# Patient Record
Sex: FEMALE | Race: WHITE | NOT HISPANIC OR LATINO | Employment: STUDENT | URBAN - METROPOLITAN AREA
[De-identification: names, ages, dates, MRNs, and addresses within clinical notes are randomized per-mention and may not be internally consistent; named-entity substitution may affect disease eponyms.]

---
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
## 2019-03-05 ENCOUNTER — OFFICE VISIT (OUTPATIENT)
Dept: FAMILY MEDICINE CLINIC | Facility: CLINIC | Age: 19
End: 2019-03-05
Payer: COMMERCIAL

## 2019-03-05 VITALS
BODY MASS INDEX: 35.23 KG/M2 | HEART RATE: 80 BPM | HEIGHT: 66 IN | TEMPERATURE: 98.7 F | WEIGHT: 219.2 LBS | SYSTOLIC BLOOD PRESSURE: 118 MMHG | RESPIRATION RATE: 16 BRPM | DIASTOLIC BLOOD PRESSURE: 74 MMHG

## 2019-03-05 DIAGNOSIS — Z00.00 PHYSICAL EXAM: Primary | ICD-10-CM

## 2019-03-05 DIAGNOSIS — Z23 ENCOUNTER FOR IMMUNIZATION: ICD-10-CM

## 2019-03-05 DIAGNOSIS — Z11.1 ENCOUNTER FOR PPD TEST: ICD-10-CM

## 2019-03-05 PROCEDURE — 99395 PREV VISIT EST AGE 18-39: CPT | Performed by: FAMILY MEDICINE

## 2019-03-05 PROCEDURE — 86580 TB INTRADERMAL TEST: CPT | Performed by: FAMILY MEDICINE

## 2019-03-05 PROCEDURE — 90734 MENACWYD/MENACWYCRM VACC IM: CPT | Performed by: FAMILY MEDICINE

## 2019-03-05 PROCEDURE — 90472 IMMUNIZATION ADMIN EACH ADD: CPT | Performed by: FAMILY MEDICINE

## 2019-03-05 PROCEDURE — 90682 RIV4 VACC RECOMBINANT DNA IM: CPT | Performed by: FAMILY MEDICINE

## 2019-03-05 PROCEDURE — 90471 IMMUNIZATION ADMIN: CPT | Performed by: FAMILY MEDICINE

## 2019-03-05 NOTE — PROGRESS NOTES
Chidi Pablo 2000 female MRN: [id number]    [address]      ASSESSMENT/PLAN  Chidi Pablo is a 22 y o  female presents to the office for an Employee physical     1) Physical Exam:  - PPD testing performed today, patient will return in 2 days for reading  - Vaccines: UTD including TDAP, and Flu  Flu was given today  Menactra last dose given today   - Patient is stable, and cleared to work    -long discussion of want to anticipate her 1st Pap smear at the age of 23  -education given on diet and exercise given elevated BMI  -stressed the importance to continue seen eye doctor and the dentist yearly    Disposition:  Return to the office as needed    Future Appointments   Date Time Provider Sotero Sauceda   3/7/2019 10:30 AM [address] [place] [address]          SUBJECTIVE  CC: Physical Exam      HPI:  Chidi Pablo is a 22 y o  female who 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  Patient is able to climb stairs without any difficulties  Patient is able to lift greater than 20 lb without any difficulties  Diet: Try eat healthy; wants to cahnge to lsoe weight  Exercise: played sports in school  Trying  Dentist: Once a year  Eye Doc: 2 years  Normal/ contacts/ glasses  No sexually active  Regular periods use to be irregular  First menses 15 years  PHQ-9 Depression Screening    PHQ-9:    Frequency of the following problems over the past two weeks:       Little interest or pleasure in doing things:  0 - not at all  Feeling down, depressed, or hopeless:  0 - not at all  PHQ-2 Score:  0         Review of Systems   Constitutional: Negative for activity change, appetite change, chills, fatigue and fever  HENT: Negative for congestion  Eyes: Negative for visual disturbance  Respiratory: Negative for cough, chest tightness and shortness of breath      Cardiovascular: Negative for chest pain and leg swelling  Gastrointestinal: Negative for abdominal distention, abdominal pain, constipation, diarrhea, nausea and vomiting  Musculoskeletal: Negative  Allergic/Immunologic: Negative for environmental allergies  Neurological: Positive for light-headedness (sometimes)  Negative for dizziness and headaches  Psychiatric/Behavioral: Negative  All other systems reviewed and are negative  Historical Information   The patient history was reviewed as follows:  Past Medical History:   Diagnosis Date    Congenital skin anomalies     last assessed: 7/23/2013    Contact lens/glasses fitting     Cyst, pilonidal, with abscess     last assessed: 4/13/2017    Idiopathic urticaria     last assessed: 7/23/2013         Past Surgical History:   Procedure Laterality Date    [place] REMV PILONIDAL LESION EXTENS N/A 6/20/2017    Procedure: EXCISION PILONIDAL CYST AND SINUS WITH MARSUPILIZATION;  Surgeon: Estiven Stoll MD;  Location: [facility] MAIN OR;  Service: General     Family History   Problem Relation Age of Onset    Hypertension Maternal Grandmother     No Known Problems Mother     No Known Problems Father       Social History   Social History     Substance and Sexual Activity   Alcohol Use No     Social History     Substance and Sexual Activity   Drug Use No     Social History     Tobacco Use   Smoking Status Never Smoker   Smokeless Tobacco Never Used     Vitals:    03/05/19 0937   BP: 118/74   BP Location: Left arm   Patient Position: Sitting   Cuff Size: Standard   Pulse: 80   Resp: 16   Temp: 98 7 °F (37 1 °C)   Weight: 99 4 kg (219 lb 3 2 oz)   Height: 5' 6" (1 676 m)     Medications:   No current outpatient medications on file      Allergies   Allergen Reactions    Other Itching     Apple skin-- throat itches       OBJECTIVE  Vitals:   Vitals:    03/05/19 0937   BP: 118/74   BP Location: Left arm   Patient Position: Sitting   Cuff Size: Standard   Pulse: 80   Resp: 16   Temp: 98 7 °F (37 1 °C) Weight: 99 4 kg (219 lb 3 2 oz)   Height: 5' 6" (1 676 m)         Physical Exam   Constitutional: She is oriented to person, place, and time  Vital signs are normal  She appears well-developed and well-nourished  HENT:   Head: Normocephalic and atraumatic  Right Ear: Tympanic membrane, external ear and ear canal normal    Left Ear: Tympanic membrane, external ear and ear canal normal    Nose: Nose normal    Mouth/Throat: Oropharynx is clear and moist    Eyes: Pupils are equal, round, and reactive to light  Conjunctivae and EOM are normal    Neck: Normal range of motion  Neck supple  Cardiovascular: Normal rate, regular rhythm, S1 normal, S2 normal, normal heart sounds and intact distal pulses  No murmur heard  Pulmonary/Chest: Effort normal and breath sounds normal  No respiratory distress  She has no wheezes  Musculoskeletal: Normal range of motion  She exhibits no edema  Neurological: She is alert and oriented to person, place, and time  She has normal strength  Skin: Skin is warm  Psychiatric: She has a normal mood and affect  Her speech is normal and behavior is normal  Judgment and thought content normal    Vitals reviewed                   Janel Garcia MD,   [facility]  3/5/2019

## 2019-03-05 NOTE — PROGRESS NOTES
HEALTH MAINTENANCE OFFICE VISIT  [facility] - [facility]    NAME: Nini Mims  AGE: 22 y o  SEX: female  : 2000     DATE: 3/5/2019     Assessment and Plan:     {Assess/Plan SmartLinks:83082}    1  ***    2  Patient Counseling:  · Nutrition: Stressed importance of moderation in sodium/caffeine intake, saturated fat and cholesterol, caloric balance, sufficient intake of fresh fruits, vegetables, fiber, calcium, iron, and 1 mg of folate supplement per day (for females capable of pregnancy)  · Exercise: Stressed the importance of regular exercise at least 150 mins/week  · Substance Abuse: Discussed cessation/primary prevention of tobacco, alcohol, or other drug use; driving or other dangerous activities under the influence; availability of treatment for abuse  · Sexuality: Discussed sexually transmitted diseases, partner selection, use of condoms, avoidance of unintended pregnancy  and contraceptive alternatives  · Injury prevention: Discussed safety belts, safety helmets, smoke detector, smoking near bedding or upholstery  · Dental health: Discussed importance of regular tooth brushing, flossing, and dental visits  · Immunizations reviewed  ***  · Discussed benefits of screening ***  · After hours service discussed with patient    3  Discussed the patient's BMI with her  The BMI {[facility]72623}    4  Follow up {follow-up interval:59110}  Chief Complaint:     Chief Complaint   Patient presents with    Physical Exam        History of Present Illness:      Well Adult Physical   Patient here for a comprehensive physical exam The patient reports {problems:83256}    Diet and Physical Activity  Diet: {diet; well adult:53250}  Weight concerns: {weight concerns; well adult:36885}  Exercise: {exericse; well adult:84086}      Depression Screen  PHQ-9 Depression Screening    PHQ-9:    Frequency of the following problems over the past two weeks: Little interest or pleasure in doing things:  0 - not at all  Feeling down, depressed, or hopeless:  0 - not at all  PHQ-2 Score:  0            General Health  H[facility]: [facility]04[facility]}  Vision: {vision; well adult:30900}  Dental: {dental; well adult:43005}    Reproductive Health  ***    The following portions of the patient's history were reviewed and updated as appropriate: allergies, current medications, past family history, past medical history, past social history, past surgical history and problem list      Review of Systems:     Review of Systems     Past Medical History:     Past Medical History:   Diagnosis Date    Congenital skin anomalies     last assessed: 7/23/2013    Contact lens/glasses fitting     Cyst, pilonidal, with abscess     last assessed: 4/13/2017    Idiopathic urticaria     last assessed: 7/23/2013        Past Surgical History:     Past Surgical History:   Procedure Laterality Date    [place] REMV PILONIDAL LESION EXTENS N/A 6/20/2017    Procedure: EXCISION PILONIDAL CYST AND SINUS WITH MARSUPILIZATION;  Surgeon: Romulo Siddiqui MD;  Location: [facility];  Service: General        Social History:     Social History     Socioeconomic History    Marital status: Single     Spouse name: None    Number of children: None    Years of education: None    Highest education level: None   Occupational History    None   Social Needs    Financial resource strain: None    Food insecurity:     Worry: None     Inability: None    Transportation needs:     Medical: None     Non-medical: None   Tobacco Use    Smoking status: Never Smoker    Smokeless tobacco: Never Used   Substance and Sexual Activity    Alcohol use: No    Drug use: No    Sexual activity: None   Lifestyle    Physical activity:     Days per week: None     Minutes per session: None    Stress: None   Relationships    Social connections:     Talks on phone: None     Gets together: None     Attends Bahai service: None Active member of club or organization: None     Attends meetings of clubs or organizations: None     Relationship status: None    Intimate partner violence:     Fear of current or ex partner: None     Emotionally abused: None     Physically abused: None     Forced sexual activity: None   Other Topics Concern    None   Social History Narrative    None        Family History:     Family History   Problem Relation Age of Onset    Hypertension Maternal Grandmother     No Known Problems Mother     No Known Problems Father         Current Medications:     Outpatient Medications Prior to Visit   Medication Sig Dispense Refill    acetaminophen (TYLENOL) 325 mg tablet Take 650 mg by mouth every 6 (six) hours as needed for mild pain       No facility-administered medications prior to visit  Allergies: Allergies   Allergen Reactions    Other Itching     Apple skin-- throat itches        Objective:     Physical Exam:  {exam; complete:63515}    Data:    Laboratory Results: {Labs reviewed:17442}  Radiology/Other Diagnostic Testing Results: {Results Review Statement:34321}     Health Maintenance: There are no preventive care reminders to display for this patient    Immunization History   Administered Date(s) Administered    DTaP 5 2000, 2000, 2000, 10/27/2000, 08/22/2003    HPV Quadrivalent 06/23/2010, 08/23/2010, 12/23/2010    Hep B, adult 2000, 2000, 2000    Hib (HbOC) 1999, 1999, 1999, 10/27/2000    IPV 1999, 1999, 08/25/2000, 08/22/2003    MMR 08/25/2000, 08/22/2003    Meningococcal Polysaccharide (MPSV4) 06/23/2010    Tdap 06/23/2010    Varicella 11/03/2003, 10/16/2009       MD Guillermo Miranda 3

## 2019-03-07 ENCOUNTER — CLINICAL SUPPORT (OUTPATIENT)
Dept: FAMILY MEDICINE CLINIC | Facility: CLINIC | Age: 19
End: 2019-03-07

## 2019-03-07 DIAGNOSIS — Z11.1 ENCOUNTER FOR PPD SKIN TEST READING: Primary | ICD-10-CM

## 2019-03-07 PROCEDURE — 99024 POSTOP FOLLOW-UP VISIT: CPT

## 2019-03-18 ENCOUNTER — CLINICAL SUPPORT (OUTPATIENT)
Dept: FAMILY MEDICINE CLINIC | Facility: CLINIC | Age: 19
End: 2019-03-18
Payer: COMMERCIAL

## 2019-03-18 DIAGNOSIS — Z11.1 PPD SCREENING TEST: Primary | ICD-10-CM

## 2019-03-18 PROCEDURE — 86580 TB INTRADERMAL TEST: CPT

## 2019-03-20 ENCOUNTER — CLINICAL SUPPORT (OUTPATIENT)
Dept: FAMILY MEDICINE CLINIC | Facility: CLINIC | Age: 19
End: 2019-03-20

## 2019-03-20 DIAGNOSIS — Z11.1 ENCOUNTER FOR PPD SKIN TEST READING: Primary | ICD-10-CM

## 2019-03-20 PROCEDURE — 99024 POSTOP FOLLOW-UP VISIT: CPT

## 2020-12-07 ENCOUNTER — TELEMEDICINE (OUTPATIENT)
Dept: FAMILY MEDICINE CLINIC | Facility: CLINIC | Age: 20
End: 2020-12-07
Payer: COMMERCIAL

## 2020-12-07 ENCOUNTER — TELEPHONE (OUTPATIENT)
Dept: FAMILY MEDICINE CLINIC | Facility: CLINIC | Age: 20
End: 2020-12-07

## 2020-12-07 DIAGNOSIS — R51.9 NONINTRACTABLE HEADACHE, UNSPECIFIED CHRONICITY PATTERN, UNSPECIFIED HEADACHE TYPE: ICD-10-CM

## 2020-12-07 DIAGNOSIS — R50.9 FEVER, UNSPECIFIED FEVER CAUSE: ICD-10-CM

## 2020-12-07 DIAGNOSIS — R51.9 NONINTRACTABLE HEADACHE, UNSPECIFIED CHRONICITY PATTERN, UNSPECIFIED HEADACHE TYPE: Primary | ICD-10-CM

## 2020-12-07 DIAGNOSIS — Z20.822 EXPOSURE TO COVID-19 VIRUS: ICD-10-CM

## 2020-12-07 PROCEDURE — 99214 OFFICE O/P EST MOD 30 MIN: CPT | Performed by: FAMILY MEDICINE

## 2020-12-07 PROCEDURE — U0003 INFECTIOUS AGENT DETECTION BY NUCLEIC ACID (DNA OR RNA); SEVERE ACUTE RESPIRATORY SYNDROME CORONAVIRUS 2 (SARS-COV-2) (CORONAVIRUS DISEASE [COVID-19]), AMPLIFIED PROBE TECHNIQUE, MAKING USE OF HIGH THROUGHPUT TECHNOLOGIES AS DESCRIBED BY CMS-2020-01-R: HCPCS | Performed by: FAMILY MEDICINE

## 2020-12-09 LAB — SARS-COV-2 RNA SPEC QL NAA+PROBE: DETECTED

## 2020-12-16 ENCOUNTER — TELEPHONE (OUTPATIENT)
Dept: FAMILY MEDICINE CLINIC | Facility: CLINIC | Age: 20
End: 2020-12-16

## 2021-01-06 ENCOUNTER — TELEPHONE (OUTPATIENT)
Dept: FAMILY MEDICINE CLINIC | Facility: CLINIC | Age: 21
End: 2021-01-06

## 2021-01-06 NOTE — TELEPHONE ENCOUNTER
LMOM for patient to call and schedule virtual appointment with Dr Fabienne Renee today to clear her for return to work

## 2021-05-18 ENCOUNTER — OFFICE VISIT (OUTPATIENT)
Dept: FAMILY MEDICINE CLINIC | Facility: CLINIC | Age: 21
End: 2021-05-18
Payer: COMMERCIAL

## 2021-05-18 VITALS
DIASTOLIC BLOOD PRESSURE: 72 MMHG | HEART RATE: 73 BPM | OXYGEN SATURATION: 99 % | RESPIRATION RATE: 16 BRPM | HEIGHT: 66 IN | TEMPERATURE: 98.3 F | SYSTOLIC BLOOD PRESSURE: 102 MMHG | WEIGHT: 203.4 LBS | BODY MASS INDEX: 32.69 KG/M2

## 2021-05-18 DIAGNOSIS — Z12.4 CERVICAL CANCER SCREENING: ICD-10-CM

## 2021-05-18 DIAGNOSIS — Z00.00 PHYSICAL EXAM: Primary | ICD-10-CM

## 2021-05-18 DIAGNOSIS — Z23 NEED FOR TDAP VACCINATION: ICD-10-CM

## 2021-05-18 LAB
SL AMB  POCT GLUCOSE, UA: NORMAL
SL AMB LEUKOCYTE ESTERASE,UA: 25
SL AMB POCT BILIRUBIN,UA: NORMAL
SL AMB POCT BLOOD,UA: NORMAL
SL AMB POCT CLARITY,UA: CLEAR
SL AMB POCT COLOR,UA: YELLOW
SL AMB POCT KETONES,UA: 15
SL AMB POCT NITRITE,UA: NORMAL
SL AMB POCT PH,UA: 6.5
SL AMB POCT SPECIFIC GRAVITY,UA: 1.02
SL AMB POCT URINE PROTEIN: NORMAL
SL AMB POCT UROBILINOGEN: NORMAL

## 2021-05-18 PROCEDURE — 81002 URINALYSIS NONAUTO W/O SCOPE: CPT | Performed by: FAMILY MEDICINE

## 2021-05-18 PROCEDURE — 99395 PREV VISIT EST AGE 18-39: CPT | Performed by: FAMILY MEDICINE

## 2021-05-18 PROCEDURE — 90471 IMMUNIZATION ADMIN: CPT | Performed by: FAMILY MEDICINE

## 2021-05-18 PROCEDURE — 1036F TOBACCO NON-USER: CPT | Performed by: FAMILY MEDICINE

## 2021-05-18 PROCEDURE — 90715 TDAP VACCINE 7 YRS/> IM: CPT | Performed by: FAMILY MEDICINE

## 2021-05-18 PROCEDURE — 3008F BODY MASS INDEX DOCD: CPT | Performed by: FAMILY MEDICINE

## 2021-05-18 PROCEDURE — 3725F SCREEN DEPRESSION PERFORMED: CPT | Performed by: FAMILY MEDICINE

## 2021-05-18 RX ORDER — GLUCOSAMINE/D3/BOSWELLIA SERRA 1500MG-400
TABLET ORAL
COMMUNITY

## 2021-05-18 RX ORDER — TRIAMCINOLONE ACETONIDE 1 MG/G
CREAM TOPICAL 2 TIMES DAILY
COMMUNITY

## 2021-05-18 NOTE — LETTER
May 18, 2021     Patient: Merlene Adam   YOB: 2000   Date of Visit: 5/18/2021       To Whom it May Concern:    Merlene Adam is under my professional care  She was seen in my office on 5/18/2021  She is cleared to work with no restrictions  If you have any questions or concerns, please don't hesitate to call           Sincerely,          Nuria Murillo MD        CC: No Recipients

## 2021-05-18 NOTE — PROGRESS NOTES
Khoa Barnes 2000 female MRN: [id number]    [facility]      ASSESSMENT/PLAN  Khoa Barnes is a 23 y o  female presents to the office for an Employee physical     1) Physical Exam:    - PPD testing 1/2021; Will repeat at her job  - Vaccines: gave TD today, NO flu shot this year  - COVID first dose; 2nd this Friday ( [facility]ara [id number])  - Patient is stable, and cleared to work  - Pap performed today with HPV testing ; not sexually active there GC not performed    Disposition: RTC as needed    BMI Counseling: Body mass index is 32 83 kg/m²  The BMI is above normal  Nutrition recommendations include consuming healthier snacks  No future appointments  SUBJECTIVE  CC: Physical Exam (patient here for annual exam)      HPI:  Khoa Barnes is a 23 y o  female who presents for an employee physical for [address]  Patient is able to climb stairs without any difficulties  Patient is able to lift greater than 20 lb without any difficulties  NO history Broke bones  No pmhx  TD last in 2020    Review of Systems   Constitutional: Negative for activity change, appetite change, chills, fatigue and fever  HENT: Negative for congestion  Respiratory: Negative for cough, chest tightness and shortness of breath  Cardiovascular: Negative for chest pain and leg swelling  Gastrointestinal: Negative for abdominal distention, abdominal pain, constipation, diarrhea, nausea and vomiting  All other systems reviewed and are negative        Historical Information   The patient history was reviewed as follows:  Past Medical History:   Diagnosis Date    Congenital skin anomalies     last assessed: 7/23/2013    Contact lens/glasses fitting     Cyst, pilonidal, with abscess     last assessed: 4/13/2017    Idiopathic urticaria     last assessed: 7/23/2013         Past Surgical History:   Procedure Laterality Date    MO REMV PILONIDAL LESION EXTENS N/A 6/20/2017    Procedure: EXCISION PILONIDAL CYST AND SINUS WITH MARSUPILIZATION;  Surgeon: Alexandra Snow MD;  Location: [facility];  Service: General     Family History   Problem Relation Age of Onset    Hypertension Maternal Grandmother     No Known Problems Mother     No Known Problems Father       Social History   Social History     Substance and Sexual Activity   Alcohol Use No     Social History     Substance and Sexual Activity   Drug Use No     Social History     Tobacco Use   Smoking Status Never Smoker   Smokeless Tobacco Never Used     Vitals:    05/18/21 0836   BP: 102/72   BP Location: Left arm   Patient Position: Sitting   Cuff Size: Standard   Pulse: 73   Resp: 16   Temp: 98 3 °F (36 8 °C)   TempSrc: Temporal   SpO2: 99%   Weight: 92 3 kg (203 lb 6 4 oz)   Height: 5' 6" (1 676 m)     Medications:     Current Outpatient Medications:     Biotin 68351 MCG TABS, Take by mouth, Disp: , Rfl:     triamcinolone (KENALOG) 0 1 % cream, Apply topically 2 (two) times a day, Disp: , Rfl:     Allergies   Allergen Reactions    Other Itching     Apple skin-- throat itches       OBJECTIVE  Vitals:   Vitals:    05/18/21 0836   BP: 102/72   BP Location: Left arm   Patient Position: Sitting   Cuff Size: Standard   Pulse: 73   Resp: 16   Temp: 98 3 °F (36 8 °C)   TempSrc: Temporal   SpO2: 99%   Weight: 92 3 kg (203 lb 6 4 oz)   Height: 5' 6" (1 676 m)         Physical Exam  Vitals signs reviewed  Constitutional:       Appearance: She is well-developed  HENT:      Head: Normocephalic and atraumatic  Right Ear: Tympanic membrane, ear canal and external ear normal       Left Ear: Tympanic membrane, ear canal and external ear normal       Nose: Nose normal       Mouth/Throat:      Mouth: Mucous membranes are moist    Eyes:      Conjunctiva/sclera: Conjunctivae normal       Pupils: Pupils are equal, round, and reactive to light     Neck:      Musculoskeletal: Normal range of motion and neck supple  Cardiovascular:      Rate and Rhythm: Normal rate and regular rhythm  Heart sounds: Normal heart sounds  Pulmonary:      Effort: Pulmonary effort is normal  No respiratory distress  Breath sounds: Normal breath sounds  Abdominal:      General: Abdomen is flat  Bowel sounds are normal    Genitourinary:     Vagina: Normal       Cervix: Discharge (normal) present  No cervical motion tenderness or friability  Musculoskeletal: Normal range of motion  Skin:     General: Skin is warm  Capillary Refill: Capillary refill takes less than 2 seconds  Neurological:      General: No focal deficit present  Mental Status: She is alert and oriented to person, place, and time  Psychiatric:         Mood and Affect: Mood normal          Thought Content:  Thought content normal          Judgment: Judgment normal                     Marito Weems MD,   [facility]  5/18/2021

## 2021-05-20 LAB
CYTOLOGIST CVX/VAG CYTO: NORMAL
DX ICD CODE: NORMAL
HPV I/H RISK 4 DNA CVX QL PROBE+SIG AMP: NEGATIVE
OTHER STN SPEC: NORMAL
PATH REPORT.FINAL DX SPEC: NORMAL
SL AMB NOTE:: NORMAL
SL AMB SPECIMEN ADEQUACY: NORMAL
SL AMB TEST METHODOLOGY: NORMAL

## 2021-05-22 ENCOUNTER — TELEPHONE (OUTPATIENT)
Dept: FAMILY MEDICINE CLINIC | Facility: CLINIC | Age: 21
End: 2021-05-22

## 2021-05-22 NOTE — TELEPHONE ENCOUNTER
----- Message from Alfornia Cushing, MD sent at 5/20/2021  9:12 PM EDT -----  Please advise patient that her papsmear is normal

## 2022-06-02 ENCOUNTER — OFFICE VISIT (OUTPATIENT)
Dept: OBGYN CLINIC | Facility: CLINIC | Age: 22
End: 2022-06-02
Payer: COMMERCIAL

## 2022-06-02 VITALS — WEIGHT: 198 LBS | SYSTOLIC BLOOD PRESSURE: 116 MMHG | DIASTOLIC BLOOD PRESSURE: 76 MMHG | BODY MASS INDEX: 31.96 KG/M2

## 2022-06-02 DIAGNOSIS — Z30.011 ENCOUNTER FOR INITIAL PRESCRIPTION OF CONTRACEPTIVE PILLS: Primary | ICD-10-CM

## 2022-06-02 PROCEDURE — 99202 OFFICE O/P NEW SF 15 MIN: CPT | Performed by: NURSE PRACTITIONER

## 2022-06-02 PROCEDURE — 1036F TOBACCO NON-USER: CPT | Performed by: NURSE PRACTITIONER

## 2022-06-02 RX ORDER — NORETHINDRONE ACETATE AND ETHINYL ESTRADIOL 1MG-20(21)
1 KIT ORAL DAILY
Qty: 84 TABLET | Refills: 0 | Status: SHIPPED | OUTPATIENT
Start: 2022-06-02

## 2022-06-02 NOTE — ASSESSMENT & PLAN NOTE
Reviewed birth control options including OCP, NuvaRing, Patch, Depo provera, Nexplanon  Reviewed when to start  What to do if misses pill  Recommended condom use for STD protection and back up method for at least first month after starting pill or if misses more than 2 pills in the pack  Reviewed common side effects of pill including N/V, HA, Breast Pain, Weight gain, bloating, mood swings  Reassured side effects diminished after first month or two on the pill  Reviewed clotting risk and S/S of PE, DVT, MI, and stroke    Rx for OCP Junel 1/20 Fe sent to pharmacy  RTO 3 months for annual/pill check

## 2022-06-02 NOTE — PROGRESS NOTES
Assessment/Plan:    Encounter for initial prescription of contraceptive pills  Reviewed birth control options including OCP, NuvaRing, Patch, Depo provera, Nexplanon  Reviewed when to start  What to do if misses pill  Recommended condom use for STD protection and back up method for at least first month after starting pill or if misses more than 2 pills in the pack  Reviewed common side effects of pill including N/V, HA, Breast Pain, Weight gain, bloating, mood swings  Reassured side effects diminished after first month or two on the pill  Reviewed clotting risk and S/S of PE, DVT, MI, and stroke  Rx for OCP Junel 1/20 Fe sent to pharmacy  RTO 3 months for annual/pill check       Diagnoses and all orders for this visit:    Encounter for initial prescription of contraceptive pills  -     norethindrone-ethinyl estradiol (JUNEL FE 1/20) 1-20 MG-MCG per tablet; Take 1 tablet by mouth daily          Subjective:      Patient ID: Rachel Aguilar is a 20 y o  female  Pt here to discuss contraception  Currently uses Condoms  LMP: 5/12/2022  Period Cycle (Days): 32  Period Duration (Days): 6  Period Pattern: Regular  Menstrual Flow: Moderate, Heavy  Menstrual Control: Tampon  Menstrual Control Change Freq (Hours): 4-5  Dysmenorrhea: None    Last annual exam/pap smear 5/2021 Normal per patient    Coitar[place] age 24, has had one lifetime male partner  Denies the need for STD testing today  Denies any hx of migraines with aura, hypertension, or any family hx of clotting disorders  She thinks she would like oral contraceptive pill, but would like to discuss      The following portions of the patient's history were reviewed and updated as appropriate: allergies, current medications, past family history, past medical history, past social history, past surgical history and problem list     Review of Systems   Genitourinary: Negative            Objective:      /76 (BP Location: Right arm, Patient Position: Sitting, Cuff Size: Large)   Wt 89 8 kg (198 lb)   LMP 05/12/2022 (Exact Date)   BMI 31 96 kg/m²      Physical Exam  Vitals and nursing note reviewed  Constitutional:       Appearance: Normal appearance  She is normal weight  Cardiovascular:      Rate and Rhythm: Normal rate and regular rhythm  Pulses: Normal pulses  Heart sounds: Normal heart sounds  Pulmonary:      Effort: Pulmonary effort is normal       Breath sounds: Normal breath sounds  Abdominal:      General: Abdomen is flat  Bowel sounds are normal       Palpations: Abdomen is soft  Musculoskeletal:         General: Normal range of motion  Skin:     General: Skin is warm and dry  Neurological:      Mental Status: She is alert and oriented to person, place, and time  Psychiatric:         Mood and Affect: Mood normal          Behavior: Behavior normal          Thought Content:  Thought content normal          Judgment: Judgment normal

## 2022-07-13 ENCOUNTER — OFFICE VISIT (OUTPATIENT)
Dept: URGENT CARE | Facility: CLINIC | Age: 22
End: 2022-07-13
Payer: COMMERCIAL

## 2022-07-13 VITALS — TEMPERATURE: 97.5 F | HEART RATE: 81 BPM | OXYGEN SATURATION: 98 % | RESPIRATION RATE: 14 BRPM

## 2022-07-13 DIAGNOSIS — R39.9 UTI SYMPTOMS: Primary | ICD-10-CM

## 2022-07-13 LAB
SL AMB  POCT GLUCOSE, UA: ABNORMAL
SL AMB LEUKOCYTE ESTERASE,UA: ABNORMAL
SL AMB POCT BILIRUBIN,UA: ABNORMAL
SL AMB POCT BLOOD,UA: ABNORMAL
SL AMB POCT CLARITY,UA: ABNORMAL
SL AMB POCT COLOR,UA: ABNORMAL
SL AMB POCT KETONES,UA: ABNORMAL
SL AMB POCT NITRITE,UA: ABNORMAL
SL AMB POCT PH,UA: 5
SL AMB POCT SPECIFIC GRAVITY,UA: 1.03
SL AMB POCT URINE PROTEIN: ABNORMAL
SL AMB POCT UROBILINOGEN: 0.2

## 2022-07-13 PROCEDURE — 87086 URINE CULTURE/COLONY COUNT: CPT | Performed by: PHYSICIAN ASSISTANT

## 2022-07-13 PROCEDURE — 87186 SC STD MICRODIL/AGAR DIL: CPT | Performed by: PHYSICIAN ASSISTANT

## 2022-07-13 PROCEDURE — 87077 CULTURE AEROBIC IDENTIFY: CPT | Performed by: PHYSICIAN ASSISTANT

## 2022-07-13 PROCEDURE — 81002 URINALYSIS NONAUTO W/O SCOPE: CPT | Performed by: PHYSICIAN ASSISTANT

## 2022-07-13 PROCEDURE — 99213 OFFICE O/P EST LOW 20 MIN: CPT | Performed by: PHYSICIAN ASSISTANT

## 2022-07-13 RX ORDER — NITROFURANTOIN 25; 75 MG/1; MG/1
100 CAPSULE ORAL 2 TIMES DAILY
Qty: 14 CAPSULE | Refills: 0 | Status: SHIPPED | OUTPATIENT
Start: 2022-07-13 | End: 2022-07-20

## 2022-07-13 NOTE — PATIENT INSTRUCTIONS
Take antibiotic as prescribed for suspected UTI, will send for culture  Recommend OTC ibuprofen/tylenol as needed for discomfort  Follow up with PCP  Return or be seen in ER with any worsening symptoms  Patient understands and is agreeable with this plan

## 2022-07-13 NOTE — PROGRESS NOTES
[facility]        NAME: Luz Maria Díaz is a 20 y o  female  : 2000    MRN: [id number]  DATE: 2022  TIME: 3:27 PM    Assessment and Plan   UTI symptoms [R39 9]  1  UTI symptoms  POCT urine dip    Urine culture    nitrofurantoin (MACROBID) 100 mg capsule         Patient Instructions     Patient Instructions   Take antibiotic as prescribed for suspected UTI, will send for culture  Recommend OTC ibuprofen/tylenol as needed for discomfort  Follow up with PCP  Return or be seen in ER with any worsening symptoms  Patient understands and is agreeable with this plan  Follow up with PCP in 3-5 days  Proceed to  ER if symptoms worsen  Chief Complaint     Chief Complaint   Patient presents with    Possible UTI         History of Present Illness       Patient is a 23-year-old female presenting today with UTI symptoms x3 days  Patient notes over the last few days she has been experiencing some increased frequency of urination and dysuria, notes that a couple times upon urination she noticed a small amount of blood in the urine, denies history of UTIs in the past   Notes that she is sexually active with 1 male partner in a monogamous relationship, denies concern of STD  LMP ended 5 days prior to this visit, is currently on birth control  Denies fever, chills, abdominal pain, flank pain  Review of Systems   Review of Systems   Constitutional: Negative for chills and fever  HENT: Negative for sore throat  Respiratory: Negative for cough  Cardiovascular: Negative for chest pain  Gastrointestinal: Negative for abdominal pain, diarrhea, nausea and vomiting  Genitourinary: Positive for dysuria, frequency and hematuria  Negative for flank pain  Musculoskeletal: Negative for arthralgias and myalgias  Skin: Negative for rash  Neurological: Negative for headaches           Current Medications       Current Outpatient Medications:     nitrofurantoin (MACROBID) 100 mg capsule, Take 1 capsule (100 mg total) by mouth 2 (two) times a day for 7 days, Disp: 14 capsule, Rfl: 0    norethindrone-ethinyl estradiol (JUNEL FE 1/20) 1-20 MG-MCG per tablet, Take 1 tablet by mouth daily, Disp: 84 tablet, Rfl: 0    Biotin 72155 MCG TABS, Take by mouth (Patient not taking: Reported on 7/13/2022), Disp: , Rfl:     triamcinolone (KENALOG) 0 1 % cream, Apply topically 2 (two) times a day (Patient not taking: No sig reported), Disp: , Rfl:     Current Allergies     Allergies as of 07/13/2022 - Reviewed 07/13/2022   Allergen Reaction Noted    Other Itching 06/16/2017            The following portions of the patient's history were reviewed and updated as appropriate: allergies, current medications, past family history, past medical history, past social history, past surgical history and problem list      Past Medical History:   Diagnosis Date    Congenital skin anomalies     last assessed: 7/23/2013    Contact lens/glasses fitting     Cyst, pilonidal, with abscess     last assessed: 4/13/2017    Idiopathic urticaria     last assessed: 7/23/2013       Past Surgical History:   Procedure Laterality Date    [place] REMV PILONIDAL LESION EXTENS N/A 6/20/2017    Procedure: EXCISION PILONIDAL CYST AND SINUS WITH MARSUPILIZATION;  Surgeon: Raul Fernández MD;  Location: [facility];  Service: General       Family History   Problem Relation Age of Onset    No Known Problems Mother     No Known Problems Father     Hypertension Maternal Grandmother          Medications have been verified  Objective   There were no vitals taken for this visit  Physical Exam     Physical Exam  Vitals reviewed  Constitutional:       Appearance: Normal appearance  HENT:      Head: Normocephalic and atraumatic  Right Ear: External ear normal       Left Ear: External ear normal    Eyes:      Conjunctiva/sclera: Conjunctivae normal    Cardiovascular:      Rate and Rhythm: Normal rate and regular rhythm        Pulses: Normal pulses  Heart sounds: Normal heart sounds  Pulmonary:      Effort: Pulmonary effort is normal       Breath sounds: Normal breath sounds  Abdominal:      General: Abdomen is flat  Bowel sounds are normal       Palpations: Abdomen is soft  Tenderness: There is no abdominal tenderness  There is no right CVA tenderness or left CVA tenderness  Skin:     General: Skin is warm  Capillary Refill: Capillary refill takes less than 2 seconds  Neurological:      General: No focal deficit present  Mental Status: She is alert and oriented to person, place, and time

## 2022-07-15 LAB — BACTERIA UR CULT: ABNORMAL

## 2022-08-29 DIAGNOSIS — Z30.011 ENCOUNTER FOR INITIAL PRESCRIPTION OF CONTRACEPTIVE PILLS: ICD-10-CM

## 2022-08-29 RX ORDER — NORETHINDRONE ACETATE AND ETHINYL ESTRADIOL 1MG-20(21)
1 KIT ORAL DAILY
Qty: 84 TABLET | Refills: 0 | Status: SHIPPED | OUTPATIENT
Start: 2022-08-29 | End: 2022-09-07 | Stop reason: ALTCHOICE

## 2022-09-07 ENCOUNTER — ANNUAL EXAM (OUTPATIENT)
Dept: OBGYN CLINIC | Facility: CLINIC | Age: 22
End: 2022-09-07
Payer: COMMERCIAL

## 2022-09-07 VITALS — SYSTOLIC BLOOD PRESSURE: 120 MMHG | WEIGHT: 194 LBS | DIASTOLIC BLOOD PRESSURE: 78 MMHG | BODY MASS INDEX: 31.31 KG/M2

## 2022-09-07 DIAGNOSIS — Z30.41 ENCOUNTER FOR SURVEILLANCE OF CONTRACEPTIVE PILLS: ICD-10-CM

## 2022-09-07 DIAGNOSIS — Z01.419 ENCNTR FOR GYN EXAM (GENERAL) (ROUTINE) W/O ABN FINDINGS: Primary | ICD-10-CM

## 2022-09-07 PROBLEM — Z30.011 ENCOUNTER FOR INITIAL PRESCRIPTION OF CONTRACEPTIVE PILLS: Status: RESOLVED | Noted: 2022-06-02 | Resolved: 2022-09-07

## 2022-09-07 PROCEDURE — 99395 PREV VISIT EST AGE 18-39: CPT | Performed by: NURSE PRACTITIONER

## 2022-09-07 PROCEDURE — 0503F POSTPARTUM CARE VISIT: CPT | Performed by: NURSE PRACTITIONER

## 2022-09-07 RX ORDER — NORETHINDRONE ACETATE AND ETHINYL ESTRADIOL 1.5-30(21)
1 KIT ORAL DAILY
Qty: 84 TABLET | Refills: 3 | Status: SHIPPED | OUTPATIENT
Start: 2022-09-07

## 2022-09-07 NOTE — PROGRESS NOTES
Assessment/Plan   Diagnoses and all orders for this visit:    Encntr for gyn exam (general) (routine) w/o abn findings    Encounter for surveillance of contraceptive pills  -     norethindrone-ethinyl estradiol-iron (Microgestin FE 1 5/30) 1 5-30 MG-MCG tablet; Take 1 tablet by mouth daily      Discussion  Reviewed with patient normal exam today  Pap deferred today  Will switch OCP to Microgestin 1 5/30 due to breakthrough bleeding on Junel 1/20 Fe  Back up method x 1 month when switching  Call office if continues to have breakthrough bleeding after three months on new OCP  Normal breast exam today  Monthly SBEs advised  Vitamin D and Calcim Supplements advised  Exercise most days of the week  Follow with PCP for regular check-ups and blood work  RTO 1 year for annual or sooner as needed  Subjective     Vince Laboy is a 20 y o  female who presents for annual well woman exam, and follow up after starting oral contraceptive pills  Last exam 5/18/2021 Pap Normal HPV negative  Pap guidelines reviewed with patient  Pt would like Pap today  Pt denies any abnormal vaginal discharge, itching  She has noticed an odor x 1-2 weeks  Denies any abnormal discharge, itching, or burning vaginally  Denies any new products vaginally  Denies any lumps, bumps, or sores vaginally  Pt sexually active with a male partner, and denies the need for STD testing today  Menstrual Cycle:  LMP: Since starting OCP she has had very long menses  Menses occurs anytime throughout the active pills and last anywhere from 10-12 days of light bleeding  Will stop sometimes then start right back up  Denies missing or skipping pills  Denies any other side effects from OCP  OB History     G 0 P 0   Contraception: OCP Junel 1/20 Fe, having continuous breakthrough bleeding  Practices monthly SBEs, no breast complaints today  Denies any bowel or bladder issues  Pt follows with PCP for regular check-ups and blood work           Review of Systems   Genitourinary: Negative          The following portions of the patient's history were reviewed and updated as appropriate: allergies, current medications, past family history, past medical history, past social history, past surgical history and problem list       Past Medical History:   Diagnosis Date    Congenital skin anomalies     last assessed: 7/23/2013    Contact lens/glasses fitting     Cyst, pilonidal, with abscess     last assessed: 4/13/2017    Idiopathic urticaria     last assessed: 7/23/2013       Past Surgical History:   Procedure Laterality Date    [place] REMV PILONIDAL LESION EXTENS N/A 6/20/2017    Procedure: EXCISION PILONIDAL CYST AND SINUS WITH MARSUPILIZATION;  Surgeon: Aleksandar Feldman MD;  Location: [address] [place] [address];  Service: General       Family History   Problem Relation Age of Onset    No Known Problems Mother     No Known Problems Father     Hypertension Maternal Grandmother        Social History     Socioeconomic History    Marital status: Single     Spouse name: Not on file    Number of children: Not on file    Years of education: Not on file    Highest education level: Not on file   Occupational History    Not on file   Tobacco Use    Smoking status: Never Smoker    Smokeless tobacco: Never Used   Vaping Use    Vaping Use: Never used   Substance and Sexual Activity    Alcohol use: Yes     Comment: socially    Drug use: No    Sexual activity: Yes     Partners: Male     Birth control/protection: OCP   Other Topics Concern    Not on file   Social History Narrative    Not on file     Social Determinants of Health     Financial Resource Strain: Not on file   Food Insecurity: Not on file   Transportation Needs: Not on file   Physical Activity: Not on file   Stress: Not on file   Social Connections: Not on file   Intimate Partner Violence: Not on file   Housing Stability: Not on file         Current Outpatient Medications:     norethindrone-ethinyl estradiol-iron (Microgestin FE 1 5/30) 1 5-30 MG-MCG tablet, Take 1 tablet by mouth daily, Disp: 84 tablet, Rfl: 3    Allergies   Allergen Reactions    Other Itching     Apple skin-- throat itches       Objective   Vitals:    09/07/22 0823   BP: 120/78   BP Location: Right arm   Patient Position: Sitting   Cuff Size: Standard   Weight: 88 kg (194 lb)     Physical Exam  Vitals and nursing note reviewed  Constitutional:       Appearance: She is well-developed  HENT:      Head: Normocephalic  Neck:      Thyroid: No thyromegaly  Trachea: No tracheal deviation  Cardiovascular:      Rate and Rhythm: Normal rate and regular rhythm  Heart sounds: Normal heart sounds  Pulmonary:      Effort: Pulmonary effort is normal       Breath sounds: Normal breath sounds  Chest:   Breasts: Breasts are symmetrical       Right: No inverted nipple, mass, nipple discharge, skin change or tenderness  Left: No inverted nipple, mass, nipple discharge, skin change or tenderness  Abdominal:      General: Bowel sounds are normal  There is no distension  Palpations: Abdomen is soft  There is no mass  Tenderness: There is no abdominal tenderness  There is no guarding or rebound  Genitourinary:     Labia:         Right: No rash, tenderness, lesion or injury  Left: No rash, tenderness, lesion or injury  Vagina: Normal       Cervix: Normal       Uterus: Normal        Adnexa: Right adnexa normal and left adnexa normal         Right: No mass, tenderness or fullness  Left: No mass, tenderness or fullness  Musculoskeletal:         General: Normal range of motion  Cervical back: Normal range of motion and neck supple  Skin:     General: Skin is warm and dry  Neurological:      Mental Status: She is alert and oriented to person, place, and time  Psychiatric:         Behavior: Behavior normal          Thought Content:  Thought content normal          Judgment: Judgment normal

## 2023-07-21 ENCOUNTER — APPOINTMENT (OUTPATIENT)
Dept: LAB | Facility: CLINIC | Age: 23
End: 2023-07-21

## 2023-07-21 DIAGNOSIS — Z00.8 HEALTH EXAMINATION IN POPULATION SURVEY: ICD-10-CM

## 2023-07-21 LAB
MEV IGG SER QL IA: NORMAL
MUV IGG SER QL IA: NORMAL
RUBV IGG SERPL IA-ACNC: 13.6 IU/ML
VZV IGG SER QL IA: ABNORMAL

## 2023-07-21 PROCEDURE — 86735 MUMPS ANTIBODY: CPT

## 2023-07-21 PROCEDURE — 86765 RUBEOLA ANTIBODY: CPT

## 2023-07-21 PROCEDURE — 86787 VARICELLA-ZOSTER ANTIBODY: CPT

## 2023-07-21 PROCEDURE — 36415 COLL VENOUS BLD VENIPUNCTURE: CPT

## 2023-07-21 PROCEDURE — 86480 TB TEST CELL IMMUN MEASURE: CPT

## 2023-07-21 PROCEDURE — 86762 RUBELLA ANTIBODY: CPT

## 2023-07-24 LAB
GAMMA INTERFERON BACKGROUND BLD IA-ACNC: 0.03 IU/ML
M TB IFN-G BLD-IMP: NEGATIVE
M TB IFN-G CD4+ BCKGRND COR BLD-ACNC: 0 IU/ML
M TB IFN-G CD4+ BCKGRND COR BLD-ACNC: 0 IU/ML
MITOGEN IGNF BCKGRD COR BLD-ACNC: >10 IU/ML

## 2023-08-23 DIAGNOSIS — Z30.40 ENCOUNTER FOR SURVEILLANCE OF CONTRACEPTIVES, UNSPECIFIED CONTRACEPTIVE: Primary | ICD-10-CM

## 2023-08-23 RX ORDER — NORETHINDRONE ACETATE AND ETHINYL ESTRADIOL 1.5-30(21)
1 KIT ORAL DAILY
Qty: 84 TABLET | Refills: 1 | Status: SHIPPED | OUTPATIENT
Start: 2023-08-23

## 2023-10-02 ENCOUNTER — NURSE TRIAGE (OUTPATIENT)
Age: 23
End: 2023-10-02

## 2023-10-02 NOTE — TELEPHONE ENCOUNTER
Pt calling. She has been experiencing worsening lip swelling and pain x 2 weeks. Pt states that she has very sensitive lips and this has happened in the past, but never to this extent. Pt is unsure of the cause. She has been using allergy meds and vaseline without much relief. Pt states that both upper and lower lips are swollen and peeling. She denies any swelling of tongue or difficulty breathing. Pt will keep her appt tomorrow with Dr. Viviane Murillo. Will use benadryl prn. Will proceed to UC or ED with any worsening of symptoms. Reason for Disposition  • Mild facial swelling (puffiness) and persists > 3 days    Answer Assessment - Initial Assessment Questions  1. ONSET: "When did the swelling start?" (e.g., minutes, hours, days)      About 2 weeks ago, progressively getting worse   2. LOCATION: "What part of the face is swollen?"     Lips   3. SEVERITY: "How swollen is it?"      Very swollen    4. ITCHING: "Is there any itching?" If Yes, ask: "How much?"   (Scale 1-10; mild, moderate or severe)     no  5. PAIN: "Is the swelling painful to touch?" If Yes, ask: "How painful is it?"   (Scale 1-10; mild, moderate or severe)      yes  6. FEVER: "Do you have a fever?" If Yes, ask: "What is it, how was it measured, and when did it start?"      no  7. CAUSE: "What do you think is causing the face swelling?"      no  8. RECURRENT SYMPTOM: "Have you had face swelling before?" If Yes, ask: "When was the last time?" "What happened that time?"      Yes   9. OTHER SYMPTOMS: "Do you have any other symptoms?" (e.g., toothache, leg swelling)      no  10.  PREGNANCY: "Is there any chance you are pregnant?" "When was your last menstrual period?"        *No Answer*    Protocols used: [facility]

## 2023-10-02 NOTE — TELEPHONE ENCOUNTER
Regarding: triage  ----- Message from David Valenzuela sent at 10/2/2023  1:49 PM EDT -----  Pt c/o of red, swollen, itchy, tender, lips. Spreading and getting worse. Pt is scheduled for tomorrow. Pt has been using Vaseline. Please, triage. Don't need to call pt is tomorrow appt is appropriate. I did try to Warm transfer but no answer.

## 2023-10-03 ENCOUNTER — OFFICE VISIT (OUTPATIENT)
Dept: FAMILY MEDICINE CLINIC | Facility: CLINIC | Age: 23
End: 2023-10-03
Payer: COMMERCIAL

## 2023-10-03 VITALS
OXYGEN SATURATION: 98 % | TEMPERATURE: 98.2 F | BODY MASS INDEX: 32.62 KG/M2 | DIASTOLIC BLOOD PRESSURE: 76 MMHG | HEIGHT: 66 IN | WEIGHT: 203 LBS | RESPIRATION RATE: 18 BRPM | SYSTOLIC BLOOD PRESSURE: 120 MMHG | HEART RATE: 87 BPM

## 2023-10-03 DIAGNOSIS — K13.0 ECZEMATOUS CHEILITIS: Primary | ICD-10-CM

## 2023-10-03 PROCEDURE — 99213 OFFICE O/P EST LOW 20 MIN: CPT | Performed by: STUDENT IN AN ORGANIZED HEALTH CARE EDUCATION/TRAINING PROGRAM

## 2023-10-03 RX ORDER — CLOTRIMAZOLE 1 %
CREAM (GRAM) TOPICAL 2 TIMES DAILY
Qty: 14 G | Refills: 0 | Status: SHIPPED | OUTPATIENT
Start: 2023-10-03

## 2023-10-03 NOTE — PROGRESS NOTES
Clinic Visit Note  Meri Gray 24 y.o. female   MRN: [id number]    Assessment and Plan      Diagnoses and all orders for this visit:    Eczematous cheilitis  We discussed trial of hydrocortisone 2.5% ointment twice daily, if no response we will trial clotrimazole, close follow-up recommended. -     hydrocortisone 2.5 % ointment; Apply topically 2 (two) times a day  -     clotrimazole (LOTRIMIN) 1 % cream; Apply topically 2 (two) times a day      My impressions and treatment recommendations were discussed in detail with the patient who verbalized understanding and had no further questions. Discharge instructions were provided. Subjective     Chief Complaint: Acute care visit    History of Present Illness:    Patient is a pleasant 21-year-old female coming in for acute care visit secondary to rash around lips that has not improved with conservative measures. The following portions of the patient's history were reviewed and updated as appropriate: allergies, current medications, past family history, past medical history, past social history, past surgical history and problem list.    REVIEW OF SYSTEMS:  A complete 12-point review of systems is negative other than that noted in the HPI. Review of Systems   Constitutional: Negative for chills and fever. HENT: Negative for ear pain and sore throat. Eyes: Negative for pain and visual disturbance. Respiratory: Negative for cough and shortness of breath. Cardiovascular: Negative for chest pain and palpitations. Gastrointestinal: Negative for abdominal pain and vomiting. Genitourinary: Negative for dysuria and hematuria. Musculoskeletal: Negative for arthralgias and back pain. Skin: Positive for rash. Negative for color change. Neurological: Negative for seizures and syncope. All other systems reviewed and are negative.         Current Outpatient Medications:   •  clotrimazole (LOTRIMIN) 1 % cream, Apply topically 2 (two) times a day, Disp: 14 g, Rfl: 0  •  hydrocortisone 2.5 % ointment, Apply topically 2 (two) times a day, Disp: 20 g, Rfl: 0  •  norethindrone-ethinyl estradiol-iron (Yuliya Fe 1.5/30) 1.5-30 MG-MCG tablet, Take 1 tablet by mouth daily, Disp: 84 tablet, Rfl: 1  •  norethindrone-ethinyl estradiol-iron (Microgestin FE 1.5/30) 1.5-30 MG-MCG tablet, Take 1 tablet by mouth daily (Patient not taking: Reported on 10/3/2023), Disp: 84 tablet, Rfl: 3  Past Medical History:   Diagnosis Date   • Congenital skin anomalies     last assessed: 7/23/2013   • Contact lens/glasses fitting    • Cyst, pilonidal, with abscess     last assessed: 4/13/2017   • Idiopathic urticaria     last assessed: 7/23/2013     Past Surgical History:   Procedure Laterality Date   • CO EXCISION PILONIDAL CYST/SINUS EXTENSIVE N/A 6/20/2017    Procedure: EXCISION PILONIDAL CYST AND SINUS WITH MARSUPILIZATION;  Surgeon: Enoc Dias MD;  Location: [facility];  Service: General     Social History     Socioeconomic History   • Marital status: Single     Spouse name: Not on file   • Number of children: Not on file   • Years of education: Not on file   • Highest education level: Not on file   Occupational History   • Not on file   Tobacco Use   • Smoking status: Never   • Smokeless tobacco: Never   Vaping Use   • Vaping Use: Never used   Substance and Sexual Activity   • Alcohol use: Yes     Comment: socially   • Drug use: No   • Sexual activity: Yes     Partners: Male     Birth control/protection: OCP   Other Topics Concern   • Not on file   Social History Narrative   • Not on file     Social Determinants of Health     Financial Resource Strain: Not on file   Food Insecurity: Not on file   Transportation Needs: Not on file   Physical Activity: Not on file   Stress: Not on file   Social Connections: Not on file   Intimate Partner Violence: Not on file   Housing Stability: Not on file     Family History   Problem Relation Age of Onset   • No Known Problems Mother    • No Known Problems Father    • Hypertension Maternal Grandmother      Allergies   Allergen Reactions   • Other Itching     Apple skin-- throat itches       Objective     Vitals:    10/03/23 1734   BP: 120/76   BP Location: Left arm   Patient Position: Sitting   Cuff Size: Large   Pulse: 87   Resp: 18   Temp: 98.2 °F (36.8 °C)   SpO2: 98%   Weight: 92.1 kg (203 lb)   Height: 5' 6" (1.676 m)       Physical Exam:     GENERAL: NAD, pleasant   HEENT:  NC/AT, PERRL, EOMI, no scleral icterus, appearance of eczema rash around lip region  CARDIAC:  RRR, +S1/S2, no S3/S4 appreciated, no m/g/r  PULMONARY:  CTA B/L, no wheezing/rales/rhonci, non-labored breathing  ABDOMEN:  Soft, NT/ND, no rebound/guarding/rigidity  Extremities:. No edema, cyanosis, or clubbing  Musculoskeletal:  Full range of motion intact in all extremities   NEUROLOGIC: Grossly intact, no focal deficits  SKIN:  No rashes or erythema noted on exposed skin  Psych: Normal affect, mood stable    ==  PLEASE NOTE:  This encounter was completed utilizing the [employer] Direct Speech Voice Recognition Software. Grammatical errors, random word insertions, pronoun errors and incomplete sentences are occasional consequences of the system due to software limitations, ambient noise and hardware issues. These may be missed by proof reading prior to affixing electronic signature. Any questions or concerns about the content, text or information contained within the body of this dictation should be directly addressed to the physician for clarification. Please do not hesitate to call me directly if you have any any questions or concerns.     Jai Rodriguez DO  [facility] Internal Medicine   [facility]

## 2023-11-17 ENCOUNTER — ANNUAL EXAM (OUTPATIENT)
Dept: OBGYN CLINIC | Facility: CLINIC | Age: 23
End: 2023-11-17

## 2023-11-17 VITALS
HEIGHT: 66 IN | DIASTOLIC BLOOD PRESSURE: 62 MMHG | SYSTOLIC BLOOD PRESSURE: 112 MMHG | BODY MASS INDEX: 32.14 KG/M2 | WEIGHT: 200 LBS

## 2023-11-17 DIAGNOSIS — Z01.419 WOMEN'S ANNUAL ROUTINE GYNECOLOGICAL EXAMINATION: Primary | ICD-10-CM

## 2023-11-17 DIAGNOSIS — Z30.40 ENCOUNTER FOR SURVEILLANCE OF CONTRACEPTIVES, UNSPECIFIED CONTRACEPTIVE: ICD-10-CM

## 2023-11-17 DIAGNOSIS — Z12.4 SCREENING FOR CERVICAL CANCER: ICD-10-CM

## 2023-11-17 PROCEDURE — G0145 SCR C/V CYTO,THINLAYER,RESCR: HCPCS | Performed by: STUDENT IN AN ORGANIZED HEALTH CARE EDUCATION/TRAINING PROGRAM

## 2023-11-17 RX ORDER — NORETHINDRONE ACETATE AND ETHINYL ESTRADIOL 1.5-30(21)
1 KIT ORAL DAILY
Qty: 84 TABLET | Refills: 3 | Status: SHIPPED | OUTPATIENT
Start: 2023-11-17

## 2023-11-17 NOTE — PROGRESS NOTES
[facility]   Annual Well Woman Exam  [place]    ASSESSMENT & PLAN: Ora Hall is a 24 y.o. [address] [facility] [place] with normal gynecologic exam.    1.  Routine well woman exam done today. 2.  Cervical Cancer Screening: Pap with reflex HPV was done today. Current ASCCP Guidelines reviewed. Silvia Escudero is low risk and does not need or desire STD testing. 4.  Gardasil is recommended for patients from 545 years of age. Ora Hall has completed the Gardasil vaccine series. [address] [place] [address] Road is sexually active. She is using  for contraception, refills sent for a year. 6. The following were reviewed in today's visit: exercise and healthy diet. 7. Return to office in 12 months for annual, sooner PRN. All questions answered to the best of my ability. Subjective:    CC:  Annual Gynecologic Examination    HPI: Lisa Catalan is a 24 y.o. [address] [facility] [place] who presents for annual gynecologic examination. Screening:  Pap 21: NILM, HPV neg  [id number]    GYN  Complaints: denies  Denies dysmenorrhea, dyspareunia, and pelvic pain  On Yuliya Fe  Missed about 3/year    No tobacco use  No chtn  No dvt  No migraine    Stable partner, male partner, BF of approx two years, met at prior job  Lives with parents, feels safe at home  No STI    OB       G/U  Complaints: denies  Denies urinary frequency and dysuria    Breast  Complaints: denies  Denies: breast lump, nipple discharge, and skin color change  Family hx: denies fhx of breast, uterine, ovarian, or colon cancers    Health Maintenance:    She exercises--hikes/runs  Works as dental assistant and [address]    She does follow a well balanced diet. She does not use tobacco and very rare alcohol  She does follow with a PCP.     Past Medical History:   Diagnosis Date    Congenital skin anomalies     last assessed: 2013    Contact lens/glasses fitting     Cyst, pilonidal, with abscess     last assessed: 2017    Idiopathic urticaria     last assessed: 2013 Past Surgical History:   Procedure Laterality Date    [place] EXCISION PILONIDAL CYST/SINUS EXTENSIVE N/A 6/20/2017    Procedure: EXCISION PILONIDAL CYST AND SINUS WITH MARSUPILIZATION;  Surgeon: Osvaldo Ham MD;  Location: [facility];  Service: General       Past OB/Gyn History:     Patient's last menstrual period was 10/15/2023.     Family History:  Family History   Problem Relation Age of Onset    No Known Problems Mother     No Known Problems Father     Hypertension Maternal Grandmother        Social History:  Social History     Socioeconomic History    Marital status: Single     Spouse name: Not on file    Number of children: Not on file    Years of education: Not on file    Highest education level: Not on file   Occupational History    Not on file   Tobacco Use    Smoking status: Never    Smokeless tobacco: Never   Vaping Use    Vaping Use: Never used   Substance and Sexual Activity    Alcohol use: Yes     Comment: socially    Drug use: No    Sexual activity: Yes     Partners: Male     Birth control/protection: OCP   Other Topics Concern    Not on file   Social History Narrative    Not on file     Social Determinants of Health     Financial Resource Strain: Not on file   Food Insecurity: Not on file   Transportation Needs: Not on file   Physical Activity: Not on file   Stress: Not on file   Social Connections: Not on file   Intimate Partner Violence: Not on file   Housing Stability: Not on file       Allergies   Allergen Reactions    Other Itching     Apple skin-- throat itches       Current Outpatient Medications:     clotrimazole (LOTRIMIN) 1 % cream, Apply topically 2 (two) times a day, Disp: 14 g, Rfl: 0    hydrocortisone 2.5 % ointment, Apply topically 2 (two) times a day, Disp: 20 g, Rfl: 0    norethindrone-ethinyl estradiol-iron (Yuliya Fe 1.5/30) 1.5-30 MG-MCG tablet, Take 1 tablet by mouth daily, Disp: 84 tablet, Rfl: 3    Review of Systems:  Denies chest pain, shortness of breath, abdominal pain, nausea, vomiting, urinary incontinence, urinary frequency, vaginal bleeding, vaginal discharge. All other systems negative unless otherwise stated. Physical Exam:  /62 (BP Location: Right arm, Patient Position: Sitting)   Ht 5' 6" (1.676 m)   Wt 90.7 kg (200 lb)   LMP 10/15/2023   BMI 32.28 kg/m²  Body mass index is 32.28 kg/m². GEN: The patient was alert and oriented x3, pleasant well-appearing female in no acute distress. HEENT:  Unremarkable, no anterior or posterior lymphadenopathy, no thyromegaly  CV:  Regular rate and rhythm, normal S1 and S2, no murmurs  RESP:  Clear to auscultation bilaterally, no wheezes, rales or rhonchi  BREAST:  Symmetric breasts with no palpable breast masses or obvious breast lesions. She has no retractions or nipple discharge. She has no axillary abnormalities or palpable masses. GI:  Soft, nontender, non-distended  MSK: bilateral lower extremities are nontender, no edema  : Normal appearing external female genitalia, normal appearing urethral meatus. On sterile speculum exam, normal appearing vaginal epithelium, no vaginal discharge, no bleeding, grossly normal appearing cervix. On bimanual exam, no cervical motion tenderness; uterus is smooth, mobile, non-tender, normal size. No tenderness or fullness in the bilateral adnexa.

## 2023-11-28 LAB
LAB AP GYN PRIMARY INTERPRETATION: NORMAL
LAB AP LMP: NORMAL
Lab: NORMAL

## 2024-02-16 DIAGNOSIS — Z30.40 ENCOUNTER FOR SURVEILLANCE OF CONTRACEPTIVES, UNSPECIFIED CONTRACEPTIVE: ICD-10-CM

## 2024-02-16 RX ORDER — NORETHINDRONE ACETATE AND ETHINYL ESTRADIOL 1.5-30(21)
1 KIT ORAL DAILY
Qty: 84 TABLET | Refills: 3 | Status: CANCELLED | OUTPATIENT
Start: 2024-02-16

## 2024-03-07 ENCOUNTER — OFFICE VISIT (OUTPATIENT)
Age: 24
End: 2024-03-07
Payer: COMMERCIAL

## 2024-03-07 VITALS
SYSTOLIC BLOOD PRESSURE: 116 MMHG | HEART RATE: 97 BPM | BODY MASS INDEX: 32.28 KG/M2 | RESPIRATION RATE: 16 BRPM | HEIGHT: 66 IN | DIASTOLIC BLOOD PRESSURE: 79 MMHG

## 2024-03-07 DIAGNOSIS — L03.032 PARONYCHIA OF TOENAIL OF LEFT FOOT: ICD-10-CM

## 2024-03-07 DIAGNOSIS — M79.671 PAIN IN BOTH FEET: Primary | ICD-10-CM

## 2024-03-07 DIAGNOSIS — B35.1 ONYCHOMYCOSIS: ICD-10-CM

## 2024-03-07 DIAGNOSIS — M79.672 PAIN IN BOTH FEET: Primary | ICD-10-CM

## 2024-03-07 DIAGNOSIS — B35.9 DERMATOPHYTOSIS: ICD-10-CM

## 2024-03-07 PROCEDURE — 99203 OFFICE O/P NEW LOW 30 MIN: CPT | Performed by: PODIATRIST

## 2024-03-07 RX ORDER — KETOCONAZOLE 20 MG/G
CREAM TOPICAL DAILY
Qty: 60 G | Refills: 1 | Status: SHIPPED | OUTPATIENT
Start: 2024-03-07 | End: 2024-04-06

## 2024-03-07 RX ORDER — TERBINAFINE HYDROCHLORIDE 250 MG/1
250 TABLET ORAL DAILY
Qty: 30 TABLET | Refills: 0 | Status: SHIPPED | OUTPATIENT
Start: 2024-03-07 | End: 2024-04-06

## 2024-03-07 NOTE — PROGRESS NOTES
Assessment/Plan:  Pain upon ambulation.  Paronychia left hallux.  Mycosis of nail.  Dermatophytosis.    Plan.  Chart reviewed.  Lab work reviewed.  PCP notes reviewed.  Patient examined.  Patient advised on condition.  At this time patient be started on both oral and topical antifungal.  She is advised to spray her shoes daily with Lysol.  She will take vitamin B 5.  Aftercare instruction given.  Return for follow-up.  Nail cutting instruction given.       Diagnoses and all orders for this visit:    Pain in both feet    Onychomycosis  -     terbinafine (LamISIL) 250 mg tablet; Take 1 tablet (250 mg total) by mouth daily  -     ketoconazole (NIZORAL) 2 % cream; Apply topically daily    Dermatophytosis  -     terbinafine (LamISIL) 250 mg tablet; Take 1 tablet (250 mg total) by mouth daily  -     ketoconazole (NIZORAL) 2 % cream; Apply topically daily    Paronychia of toenail of left foot          Subjective: Patient is concerned she may have fungus of her toenails.  It started with 1 nail of the left foot now is starting to spread.  She also has dry scaly skin of her feet.    Allergies   Allergen Reactions    Other Itching     Apple skin-- throat itches         Current Outpatient Medications:     ketoconazole (NIZORAL) 2 % cream, Apply topically daily, Disp: 60 g, Rfl: 1    terbinafine (LamISIL) 250 mg tablet, Take 1 tablet (250 mg total) by mouth daily, Disp: 30 tablet, Rfl: 0    clotrimazole (LOTRIMIN) 1 % cream, Apply topically 2 (two) times a day, Disp: 14 g, Rfl: 0    hydrocortisone 2.5 % ointment, Apply topically 2 (two) times a day, Disp: 20 g, Rfl: 0    norethindrone-ethinyl estradiol-iron (Yuliya Fe 1.5/30) 1.5-30 MG-MCG tablet, Take 1 tablet by mouth daily, Disp: 84 tablet, Rfl: 3    Patient Active Problem List   Diagnosis   (none) - all problems resolved or deleted          Patient ID: Katiuska Villalobos is a 23 y.o. female.    HPI    The following portions of the patient's history were reviewed and updated as  appropriate:     family history includes Hypertension in her maternal grandmother; No Known Problems in her father and mother.      reports that she has never smoked. She has never used smokeless tobacco. She reports current alcohol use. She reports that she does not use drugs.    Vitals:    03/07/24 1408   BP: 116/79   Pulse: 97   Resp: 16       Review of Systems      Objective:  Patient's shoes and socks removed.   Foot Exam    General  General Appearance: appears stated age and healthy   Orientation: alert and oriented to person, place, and time   Affect: appropriate       Right Foot/Ankle     Inspection and Palpation  Swelling: dorsum   Arch: pes planus  Hallux valgus: yes  Skin Exam: dry skin and tinea;     Neurovascular  Dorsalis pedis: 3+  Posterior tibial: 3+      Left Foot/Ankle      Inspection and Palpation  Swelling: dorsum   Arch: pes planus  Hallux valgus: yes  Skin Exam: dry skin and tinea;     Neurovascular  Dorsalis pedis: 3+  Posterior tibial: 3+      Physical Exam  Vitals and nursing note reviewed.   Constitutional:       Appearance: Normal appearance.   Cardiovascular:      Rate and Rhythm: Normal rate and regular rhythm.      Pulses:           Dorsalis pedis pulses are 3+ on the right side and 3+ on the left side.        Posterior tibial pulses are 3+ on the right side and 3+ on the left side.   Musculoskeletal:      Right foot: Bunion present.      Left foot: Bunion present.   Feet:      Right foot:      Skin integrity: Dry skin present.      Left foot:      Skin integrity: Dry skin present.   Skin:     Capillary Refill: Capillary refill takes less than 2 seconds.      Comments: Patient has xerosis of plantar skin secondary to dermatophytosis.  Nails are dystrophic.  Nails 1 bilateral and 3 left demonstrate distal and superficial mycosis.  Left hallux has paronychia.  Negative pus.  Fibular nail groove involved.   Neurological:      Mental Status: She is alert.   Psychiatric:         Mood and  Affect: Mood normal.         Behavior: Behavior normal.         Thought Content: Thought content normal.         Judgment: Judgment normal.

## 2024-04-15 ENCOUNTER — OFFICE VISIT (OUTPATIENT)
Age: 24
End: 2024-04-15
Payer: COMMERCIAL

## 2024-04-15 VITALS
BODY MASS INDEX: 32.28 KG/M2 | DIASTOLIC BLOOD PRESSURE: 81 MMHG | HEART RATE: 96 BPM | HEIGHT: 66 IN | RESPIRATION RATE: 17 BRPM | SYSTOLIC BLOOD PRESSURE: 118 MMHG

## 2024-04-15 DIAGNOSIS — B35.9 DERMATOPHYTOSIS: ICD-10-CM

## 2024-04-15 DIAGNOSIS — B35.1 ONYCHOMYCOSIS: ICD-10-CM

## 2024-04-15 DIAGNOSIS — L03.032 PARONYCHIA OF TOENAIL OF LEFT FOOT: ICD-10-CM

## 2024-04-15 DIAGNOSIS — M79.671 PAIN IN BOTH FEET: Primary | ICD-10-CM

## 2024-04-15 DIAGNOSIS — M79.672 PAIN IN BOTH FEET: Primary | ICD-10-CM

## 2024-04-15 PROCEDURE — 99213 OFFICE O/P EST LOW 20 MIN: CPT | Performed by: PODIATRIST

## 2024-04-15 RX ORDER — KETOCONAZOLE 20 MG/G
CREAM TOPICAL DAILY
Qty: 60 G | Refills: 1 | Status: SHIPPED | OUTPATIENT
Start: 2024-04-15 | End: 2024-05-15

## 2024-04-15 RX ORDER — TERBINAFINE HYDROCHLORIDE 250 MG/1
250 TABLET ORAL DAILY
Qty: 30 TABLET | Refills: 0 | Status: SHIPPED | OUTPATIENT
Start: 2024-04-15 | End: 2024-05-15

## 2024-04-15 NOTE — PROGRESS NOTES
Assessment/Plan:  Pain upon ambulation.  Paronychia left hallux.  Mycosis of nail.  Dermatophytosis.     Plan.  Chart reviewed.  Lab work reviewed.  PCP notes reviewed.  Patient examined.  Patient advised on condition.  At this time patient be started on both oral and topical antifungal.  She is advised to spray her shoes daily with Lysol.  She will take vitamin B 5.  Aftercare instruction given.  Return for follow-up.  Nail cutting instruction given.         Diagnoses and all orders for this visit:     Pain in both feet     Onychomycosis  -     terbinafine (LamISIL) 250 mg tablet; Take 1 tablet (250 mg total) by mouth daily  -     ketoconazole (NIZORAL) 2 % cream; Apply topically daily     Dermatophytosis  -     terbinafine (LamISIL) 250 mg tablet; Take 1 tablet (250 mg total) by mouth daily  -     ketoconazole (NIZORAL) 2 % cream; Apply topically daily     Paronychia of toenail of left foot            Subjective: Patient is concerned she may have fungus of her toenails.  It started with 1 nail of the left foot now is starting to spread.  She also has dry scaly skin of her feet.           Allergies   Allergen Reactions    Other Itching       Apple skin-- throat itches            Current Outpatient Medications:     ketoconazole (NIZORAL) 2 % cream, Apply topically daily, Disp: 60 g, Rfl: 1    terbinafine (LamISIL) 250 mg tablet, Take 1 tablet (250 mg total) by mouth daily, Disp: 30 tablet, Rfl: 0    clotrimazole (LOTRIMIN) 1 % cream, Apply topically 2 (two) times a day, Disp: 14 g, Rfl: 0    hydrocortisone 2.5 % ointment, Apply topically 2 (two) times a day, Disp: 20 g, Rfl: 0    norethindrone-ethinyl estradiol-iron (Yuliya Fe 1.5/30) 1.5-30 MG-MCG tablet, Take 1 tablet by mouth daily, Disp: 84 tablet, Rfl: 3     Patient Active Problem List   Diagnosis   (none) - all problems resolved or deleted             Patient ID: Katiuska Villalobos is a 23 y.o. female.     HPI     The following portions of the patient's history  were reviewed and updated as appropriate:      family history includes Hypertension in her maternal grandmother; No Known Problems in her father and mother.       reports that she has never smoked. She has never used smokeless tobacco. She reports current alcohol use. She reports that she does not use drugs.      Objective:  Patient's shoes and socks removed.   Foot Exam     General  General Appearance: appears stated age and healthy   Orientation: alert and oriented to person, place, and time   Affect: appropriate         Right Foot/Ankle      Inspection and Palpation  Swelling: dorsum   Arch: pes planus  Hallux valgus: yes  Skin Exam: dry skin and tinea;      Neurovascular  Dorsalis pedis: 3+  Posterior tibial: 3+        Left Foot/Ankle       Inspection and Palpation  Swelling: dorsum   Arch: pes planus  Hallux valgus: yes  Skin Exam: dry skin and tinea;      Neurovascular  Dorsalis pedis: 3+  Posterior tibial: 3+        Physical Exam  Vitals and nursing note reviewed.   Constitutional:       Appearance: Normal appearance.   Cardiovascular:      Rate and Rhythm: Normal rate and regular rhythm.      Pulses:           Dorsalis pedis pulses are 3+ on the right side and 3+ on the left side.        Posterior tibial pulses are 3+ on the right side and 3+ on the left side.   Musculoskeletal:      Right foot: Bunion present.      Left foot: Bunion present.   Feet:      Right foot:      Skin integrity: Dry skin present.      Left foot:      Skin integrity: Dry skin present.   Skin:     Capillary Refill: Capillary refill takes less than 2 seconds.      Comments: Patient has xerosis of plantar skin secondary to dermatophytosis.  Nails are dystrophic.  Nails 1 bilateral and 3 left demonstrate distal and superficial mycosis.  Left hallux has paronychia.  Negative pus.  Fibular nail groove involved.  Line of demarcation noted  Neurological:      Mental Status: She is alert.   Psychiatric:         Mood and Affect: Mood normal.          Behavior: Behavior normal.         Thought Content: Thought content normal.         Judgment: Judgment normal.

## 2024-05-31 ENCOUNTER — OFFICE VISIT (OUTPATIENT)
Age: 24
End: 2024-05-31
Payer: COMMERCIAL

## 2024-05-31 VITALS
HEART RATE: 101 BPM | SYSTOLIC BLOOD PRESSURE: 124 MMHG | RESPIRATION RATE: 17 BRPM | HEIGHT: 66 IN | DIASTOLIC BLOOD PRESSURE: 80 MMHG | BODY MASS INDEX: 32.28 KG/M2

## 2024-05-31 DIAGNOSIS — B35.1 ONYCHOMYCOSIS: ICD-10-CM

## 2024-05-31 DIAGNOSIS — M79.671 PAIN IN BOTH FEET: Primary | ICD-10-CM

## 2024-05-31 DIAGNOSIS — L03.032 PARONYCHIA OF TOENAIL OF LEFT FOOT: ICD-10-CM

## 2024-05-31 DIAGNOSIS — B35.9 DERMATOPHYTOSIS: ICD-10-CM

## 2024-05-31 DIAGNOSIS — M79.672 PAIN IN BOTH FEET: Primary | ICD-10-CM

## 2024-05-31 PROCEDURE — 99213 OFFICE O/P EST LOW 20 MIN: CPT | Performed by: PODIATRIST

## 2024-05-31 RX ORDER — KETOCONAZOLE 20 MG/G
CREAM TOPICAL DAILY
Qty: 60 G | Refills: 1 | Status: SHIPPED | OUTPATIENT
Start: 2024-05-31 | End: 2024-06-30

## 2024-05-31 RX ORDER — TERBINAFINE HYDROCHLORIDE 250 MG/1
250 TABLET ORAL DAILY
Qty: 30 TABLET | Refills: 0 | Status: SHIPPED | OUTPATIENT
Start: 2024-05-31 | End: 2024-06-30

## 2024-07-25 ENCOUNTER — APPOINTMENT (OUTPATIENT)
Dept: LAB | Facility: CLINIC | Age: 24
End: 2024-07-25

## 2024-07-25 DIAGNOSIS — Z00.8 HEALTH EXAMINATION IN POPULATION SURVEY: ICD-10-CM

## 2024-07-25 LAB
CHOLEST SERPL-MCNC: 195 MG/DL
EST. AVERAGE GLUCOSE BLD GHB EST-MCNC: 94 MG/DL
HBA1C MFR BLD: 4.9 %
HDLC SERPL-MCNC: 55 MG/DL
LDLC SERPL CALC-MCNC: 126 MG/DL (ref 0–100)
NONHDLC SERPL-MCNC: 140 MG/DL
TRIGL SERPL-MCNC: 71 MG/DL

## 2024-07-25 PROCEDURE — 83036 HEMOGLOBIN GLYCOSYLATED A1C: CPT

## 2024-07-25 PROCEDURE — 36415 COLL VENOUS BLD VENIPUNCTURE: CPT

## 2024-07-25 PROCEDURE — 80061 LIPID PANEL: CPT

## 2024-10-09 DIAGNOSIS — Z30.40 ENCOUNTER FOR SURVEILLANCE OF CONTRACEPTIVES, UNSPECIFIED CONTRACEPTIVE: ICD-10-CM

## 2024-10-09 RX ORDER — NORETHINDRONE ACETATE AND ETHINYL ESTRADIOL AND FERROUS FUMARATE 1.5-30(21)
1 KIT ORAL DAILY
Qty: 84 TABLET | Refills: 0 | Status: SHIPPED | OUTPATIENT
Start: 2024-10-09

## 2024-11-12 DIAGNOSIS — Z00.6 ENCOUNTER FOR EXAMINATION FOR NORMAL COMPARISON OR CONTROL IN CLINICAL RESEARCH PROGRAM: ICD-10-CM

## 2024-11-18 DIAGNOSIS — Z30.40 ENCOUNTER FOR SURVEILLANCE OF CONTRACEPTIVES, UNSPECIFIED CONTRACEPTIVE: ICD-10-CM

## 2024-11-19 RX ORDER — NORETHINDRONE ACETATE AND ETHINYL ESTRADIOL 1.5-30(21)
1 KIT ORAL DAILY
Qty: 28 TABLET | Refills: 0 | Status: SHIPPED | OUTPATIENT
Start: 2024-11-19

## 2024-12-09 DIAGNOSIS — Z30.40 ENCOUNTER FOR SURVEILLANCE OF CONTRACEPTIVES, UNSPECIFIED CONTRACEPTIVE: ICD-10-CM

## 2024-12-10 RX ORDER — NORETHINDRONE ACETATE AND ETHINYL ESTRADIOL AND FERROUS FUMARATE 1.5-30(21)
1 KIT ORAL DAILY
Qty: 28 TABLET | Refills: 0 | Status: SHIPPED | OUTPATIENT
Start: 2024-12-10 | End: 2024-12-20 | Stop reason: SDUPTHER

## 2024-12-20 ENCOUNTER — ANNUAL EXAM (OUTPATIENT)
Dept: OBGYN CLINIC | Facility: CLINIC | Age: 24
End: 2024-12-20
Payer: COMMERCIAL

## 2024-12-20 VITALS
BODY MASS INDEX: 33.59 KG/M2 | SYSTOLIC BLOOD PRESSURE: 104 MMHG | HEIGHT: 66 IN | DIASTOLIC BLOOD PRESSURE: 72 MMHG | WEIGHT: 209 LBS

## 2024-12-20 DIAGNOSIS — Z01.419 WELL WOMAN EXAM WITH ROUTINE GYNECOLOGICAL EXAM: ICD-10-CM

## 2024-12-20 DIAGNOSIS — Z30.40 ENCOUNTER FOR SURVEILLANCE OF CONTRACEPTIVES, UNSPECIFIED CONTRACEPTIVE: ICD-10-CM

## 2024-12-20 DIAGNOSIS — D22.9 CHANGE IN MOLE: Primary | ICD-10-CM

## 2024-12-20 PROCEDURE — S0612 ANNUAL GYNECOLOGICAL EXAMINA: HCPCS | Performed by: STUDENT IN AN ORGANIZED HEALTH CARE EDUCATION/TRAINING PROGRAM

## 2024-12-20 RX ORDER — NORETHINDRONE ACETATE AND ETHINYL ESTRADIOL 1.5-30(21)
1 KIT ORAL DAILY
Qty: 84 TABLET | Refills: 3 | Status: SHIPPED | OUTPATIENT
Start: 2024-12-20

## 2024-12-20 NOTE — PROGRESS NOTES
"Annual GYN Exam      Subjective:   Katiuska Villalobos 24 y.o.   who presents for annual exam.     Patient's last menstrual period was 2024 (exact date).     Current periods:: regular, not heavy or painful. Skipped one cycle last month, hadf neg UPT. Normal cycle this month   Denies vaginal irritation, vaginal discharge, and bladder complaints.     Sexual History- SA with 1 male partner   Contraception - OCP     Pertinent Medical History   Previous PAP Tests  Date of Last Pap: 2023 NILM, neg HPV   History of Abnormal Paps: denies     OB History    Para Term  AB Living   0 0 0 0 0 0   SAB IAB Ectopic Multiple Live Births   0 0 0 0 0       Family history reviewed and patient does not meet criteria for referral for genetic cancer assessment.    Social History   Denies tobacco use   Health Maintenance and Screenings   PHQ2    Denies feeling depressed and thoughts of self harm.     Safe at home? Yes, lives with parents     Pap: up to date      Hepatitis C: Not on file  Diabetes screening (HbA1C q3 yrs 45+ or risk factors): up to date   Lipid screening (45+ q5 yrs): up to date, counseled on elevated LDL     Gardasil completed    Flu     Objective:   /72 (BP Location: Right arm, Patient Position: Sitting, Cuff Size: Standard)   Ht 5' 6\" (1.676 m)   Wt 94.8 kg (209 lb)   LMP 2024 (Exact Date)   BMI 33.73 kg/m²   Physical Exam  Constitutional:       General: She is not in acute distress.     Appearance: Normal appearance. She is well-developed. She is not ill-appearing.   Genitourinary:      Vulva normal.        Right Adnexa: not tender, not full and no mass present.     Left Adnexa: not tender, not full and no mass present.     No cervical friability, lesion or polyp.      Uterus is not enlarged or tender.   Breasts:     Breasts are symmetrical.      Breasts are soft.     Right: Normal. No inverted nipple, mass, nipple discharge, skin change or tenderness.      Left: Normal. No " inverted nipple, mass, nipple discharge, skin change or tenderness.   HENT:      Head: Normocephalic and atraumatic.   Neck:      Thyroid: No thyromegaly or thyroid tenderness.   Cardiovascular:      Rate and Rhythm: Normal rate.   Pulmonary:      Effort: Pulmonary effort is normal. No respiratory distress.   Abdominal:      General: There is no distension.      Palpations: Abdomen is soft. There is no mass.      Tenderness: There is no abdominal tenderness. There is no guarding or rebound.   Musculoskeletal:      Cervical back: Normal range of motion and neck supple.   Lymphadenopathy:      Cervical: No cervical adenopathy.      Upper Body:      Right upper body: No supraclavicular or axillary adenopathy.      Left upper body: No supraclavicular or axillary adenopathy.   Neurological:      Mental Status: She is alert.   Psychiatric:         Attention and Perception: Attention normal.         Mood and Affect: Mood normal.   Vitals reviewed.             Assessment/Plan:   Annual Exam  We discussed dietary and lifestyle recommendations based on her age and weight.    Cervical cancer screening  Pap smear is not indicated at this time.  Previous pap smears and pap smear guidelines have been reviewed.    STI screening  STI screening declined.     Breast exam and breast cancer screening  Breast exam was done.    Health Maintenance  Reviewed risk factors for lipid screening  Reviewed risk factors for diabetes  PHQ-2 depression screen completed.    Body mass index is 33.73 kg/m².  Recommend annual skin checks       Problem List Items Addressed This Visit    None  Visit Diagnoses         Change in mole    -  Primary    Relevant Orders    Ambulatory Referral to Dermatology      Encounter for surveillance of contraceptives, unspecified contraceptive        Relevant Medications    norethindrone-ethinyl estradiol-iron (Aurovela Fe 1.5/30) 1.5-30 MG-MCG tablet      Well woman exam with routine gynecological exam

## 2025-07-10 ENCOUNTER — APPOINTMENT (OUTPATIENT)
Dept: LAB | Facility: CLINIC | Age: 25
End: 2025-07-10
Payer: COMMERCIAL

## 2025-07-10 DIAGNOSIS — Z00.8 HEALTH EXAMINATION IN POPULATION SURVEYS: Primary | ICD-10-CM

## 2025-07-10 DIAGNOSIS — Z00.6 ENCOUNTER FOR EXAMINATION FOR NORMAL COMPARISON OR CONTROL IN CLINICAL RESEARCH PROGRAM: ICD-10-CM

## 2025-07-10 LAB
CHOLEST SERPL-MCNC: 206 MG/DL (ref ?–200)
EST. AVERAGE GLUCOSE BLD GHB EST-MCNC: 100 MG/DL
HBA1C MFR BLD: 5.1 %
HDLC SERPL-MCNC: 61 MG/DL
LDLC SERPL CALC-MCNC: 130 MG/DL (ref 0–100)
NONHDLC SERPL-MCNC: 145 MG/DL
TRIGL SERPL-MCNC: 75 MG/DL (ref ?–150)

## 2025-07-10 PROCEDURE — 36415 COLL VENOUS BLD VENIPUNCTURE: CPT

## 2025-07-10 PROCEDURE — 80061 LIPID PANEL: CPT

## 2025-07-10 PROCEDURE — 83036 HEMOGLOBIN GLYCOSYLATED A1C: CPT

## 2025-07-21 LAB
APOB+LDLR+PCSK9 GENE MUT ANL BLD/T: NOT DETECTED
BRCA1+BRCA2 DEL+DUP + FULL MUT ANL BLD/T: NOT DETECTED
MLH1+MSH2+MSH6+PMS2 GN DEL+DUP+FUL M: NOT DETECTED

## (undated) DEVICE — CURITY STRETCH BANDAGE: Brand: CURITY

## (undated) DEVICE — Device

## (undated) DEVICE — GLOVE INDICATOR PI UNDERGLOVE SZ 8.5 BLUE

## (undated) DEVICE — GROUNDING PAD UNIVERSAL SLW

## (undated) DEVICE — NEEDLE BLUNT 18 G X 1 1/2IN

## (undated) DEVICE — SCD SEQUENTIAL COMPRESSION COMFORT SLEEVE MEDIUM KNEE LENGTH: Brand: KENDALL SCD

## (undated) DEVICE — LABEL STERILE (RSC) (2-SENSOR CAINE 2-LIDOCAINE 2-HEPARIN)

## (undated) DEVICE — BASIC DOUBLE BASIN 2-LF: Brand: MEDLINE INDUSTRIES, INC.

## (undated) DEVICE — PACK GENERAL LF

## (undated) DEVICE — SYRINGE 5ML LL

## (undated) DEVICE — ASTOUND IMPERVIOUS SURGICAL GOWN: Brand: CONVERTORS

## (undated) DEVICE — NEEDLE 25G X 1 1/2

## (undated) DEVICE — TIBURON LAPAROTOMY DRAPE: Brand: CONVERTORS

## (undated) DEVICE — GLOVE SRG BIOGEL 8

## (undated) DEVICE — SYRINGE 10ML LL CONTROL TOP